# Patient Record
Sex: FEMALE | Race: WHITE
[De-identification: names, ages, dates, MRNs, and addresses within clinical notes are randomized per-mention and may not be internally consistent; named-entity substitution may affect disease eponyms.]

---

## 2021-11-03 ENCOUNTER — HOSPITAL ENCOUNTER (OUTPATIENT)
Dept: HOSPITAL 79 - EXRD | Age: 41
End: 2021-11-03
Attending: INTERNAL MEDICINE
Payer: COMMERCIAL

## 2021-11-03 DIAGNOSIS — R93.6: ICD-10-CM

## 2021-11-03 DIAGNOSIS — L40.50: Primary | ICD-10-CM

## 2021-12-23 ENCOUNTER — HOSPITAL ENCOUNTER (OUTPATIENT)
Dept: HOSPITAL 79 - LAB | Age: 41
End: 2021-12-23
Attending: FAMILY MEDICINE
Payer: COMMERCIAL

## 2021-12-23 DIAGNOSIS — R73.9: ICD-10-CM

## 2021-12-23 DIAGNOSIS — N94.9: ICD-10-CM

## 2021-12-23 DIAGNOSIS — E03.9: ICD-10-CM

## 2021-12-23 DIAGNOSIS — M10.9: Primary | ICD-10-CM

## 2021-12-24 LAB
A/G RATIO: 1.9 (ref 1.2–2.2)
ALBUMIN, SERUM: 4.3 G/DL (ref 3.8–4.8)
ALKALINE PHOSPHATASE, S: 42 IU/L (ref 44–121)
ALPHA-1-ANTITRYPSIN, SERUM: 112 MG/DL (ref 101–187)
ALT (SGPT): 36 IU/L (ref 0–32)
AST (SGOT): 19 IU/L (ref 0–40)
BUN/CREATININE RATIO: 13 (ref 9–23)
BUN: 13 MG/DL (ref 6–24)
CALCIUM, SERUM: 9.4 MG/DL (ref 8.7–10.2)
CARBON DIOXIDE, TOTAL: 22 MMOL/L (ref 20–29)
CHLORIDE, SERUM: 102 MMOL/L (ref 96–106)
EGFR IF AFRICN AM: 77 (ref 59–?)
EGFR IF NONAFRICN AM: 67 (ref 59–?)
ESTIM. AVG GLU (EAG): 126 MG/DL
FERRITIN: 123 NG/ML (ref 15–150)
GLOBULIN, TOTAL: 2.3 G/DL (ref 1.5–4.5)
GLUCOSE, SERUM: 110 MG/DL (ref 65–99)
HEMOGLOBIN A1C: 6 % (ref 4.8–5.6)
MITOCHONDRIAL (M2) ANTIBODY: <20 UNITS (ref 0–20)
POTASSIUM, SERUM: 4.6 MMOL/L (ref 3.5–5.2)
PROTEIN, TOTAL, SERUM: 6.6 G/DL (ref 6–8.5)
SODIUM, SERUM: 139 MMOL/L (ref 134–144)
TSH: 2.75 UIU/ML (ref 0.45–4.5)

## 2022-09-09 ENCOUNTER — OFFICE VISIT (OUTPATIENT)
Dept: OBSTETRICS AND GYNECOLOGY | Facility: CLINIC | Age: 42
End: 2022-09-09

## 2022-09-09 DIAGNOSIS — Z12.4 SCREENING FOR CERVICAL CANCER: ICD-10-CM

## 2022-09-09 DIAGNOSIS — Z30.431 IUD CHECK UP: ICD-10-CM

## 2022-09-09 DIAGNOSIS — Z01.419 ENCOUNTER FOR ANNUAL ROUTINE GYNECOLOGICAL EXAMINATION: Primary | ICD-10-CM

## 2022-09-09 DIAGNOSIS — Z12.31 ENCOUNTER FOR SCREENING MAMMOGRAM FOR MALIGNANT NEOPLASM OF BREAST: ICD-10-CM

## 2022-09-09 PROCEDURE — 99386 PREV VISIT NEW AGE 40-64: CPT | Performed by: OBSTETRICS & GYNECOLOGY

## 2022-09-09 RX ORDER — LANOLIN ALCOHOL/MO/W.PET/CERES
1000 CREAM (GRAM) TOPICAL DAILY
COMMUNITY

## 2022-09-09 NOTE — PROGRESS NOTES
Gynecologic Annual Exam Note          GYN Annual Exam     Gynecologic Exam        Subjective     HPI  Chey Arthur is a 41 y.o. female, , who presents for annual well woman exam as a established patient . No LMP recorded..  Periods are absent secondary to birth control. Patient reports problems with: discomfort underneath left axilary area.  Would like that area examined.  No nodules noted.  .  Partner Status: Marital Status: . She is is sexually active. She has not had new partners.. STD testing recommendations have been explained to the patient and she does not desire STD testing. There were no changes to her medical or surgical history since her last visit..       Additional OB/GYN History   Current contraception: contraceptive methods: IUD.  Insertion date: possibly   Desires to: continue contraception    Last Pap : Over 3-4 years ago. Result: unknown . HPV: unknown   Last Completed Pap Smear     This patient has no relevant Health Maintenance data.        History of abnormal Pap smear: no  Family history of uterine, colon, breast, or ovarian cancer: no  Performs monthly Self-Breast Exam: no  Last mammogram: . Done in [facility]/ ?.    Last Completed Mammogram     This patient has no relevant Health Maintenance data.          History of abnormal mammogram: no    Colonoscopy: has never had a colonoscopy.  Exercises Regularly: yes  Feelings of Anxiety or Depression: no  Tobacco Usage?: No       Current Outpatient Medications:   •  cholecalciferol (VITAMIN D3) 1.25 MG (95969 UT) capsule, Take 50,000 Units by mouth Daily., Disp: , Rfl:   •  levothyroxine (SYNTHROID, LEVOTHROID) 200 MCG tablet, Take 1 tablet by mouth daily., Disp: 90 tablet, Rfl: 1  •  lisinopril (PRINIVIL,ZESTRIL) 10 MG tablet, Take 1 tablet by mouth 2 (two) times a day., Disp: 180 tablet, Rfl: 1  •  vitamin B-12 (CYANOCOBALAMIN) 1000 MCG tablet, Take 1,000 mcg by mouth Daily., Disp: , Rfl:      Patient  denies the need for medication refills today.    OB History        2    Para        Term                AB        Living   2       SAB        IAB        Ectopic        Molar        Multiple        Live Births                    Past Medical History:   Diagnosis Date   • Hypertension    • Hypothyroidism    • Obesity         Past Surgical History:   Procedure Laterality Date   •  SECTION     • CHOLECYSTECTOMY  2019       Health Maintenance   Topic Date Due   • ANNUAL PHYSICAL  Never done   • TDAP/TD VACCINES (1 - Tdap) Never done   • HEPATITIS C SCREENING  Never done   • PAP SMEAR  Never done   • COVID-19 Vaccine (3 - Booster for Pfizer series) 2022   • INFLUENZA VACCINE  10/01/2022   • Annual Gynecologic Pelvic and Breast Exam  09/10/2023   • Pneumococcal Vaccine 0-64  Aged Out       The additional following portions of the patient's history were reviewed and updated as appropriate: allergies, current medications, past family history, past medical history, past social history, past surgical history and problem list.    Review of Systems   Constitutional: Negative for chills, fever and unexpected weight loss.   Respiratory: Negative.    Cardiovascular: Negative.    Gastrointestinal: Negative for abdominal distention and abdominal pain.   Genitourinary: Negative.    Psychiatric/Behavioral: Negative for dysphoric mood and depressed mood.         I have reviewed and agree with the HPI, ROS, and historical information as entered above. Elvis De La Vega MD      Objective   There were no vitals taken for this visit.    Physical Exam  Vitals and nursing note reviewed. Exam conducted with a chaperone present.   Constitutional:       Appearance: She is well-developed. She is obese.   HENT:      Head: Normocephalic and atraumatic.   Neck:      Thyroid: No thyroid mass or thyromegaly.   Cardiovascular:      Rate and Rhythm: Normal rate and regular rhythm.      Heart sounds: Normal heart sounds. No  murmur heard.  Pulmonary:      Effort: Pulmonary effort is normal. No retractions.      Breath sounds: Normal breath sounds. No wheezing, rhonchi or rales.   Chest:      Chest wall: No mass or tenderness.   Breasts:      Right: Normal. No mass, nipple discharge, skin change or tenderness.      Left: Normal. No mass, nipple discharge, skin change or tenderness.       Abdominal:      General: Bowel sounds are normal.      Palpations: Abdomen is soft. Abdomen is not rigid. There is no mass.      Tenderness: There is no abdominal tenderness. There is no guarding.      Hernia: No hernia is present. There is no hernia in the left inguinal area.   Genitourinary:     Labia:         Right: No rash, tenderness or lesion.         Left: No rash, tenderness or lesion.       Vagina: Normal. No vaginal discharge or lesions.      Cervix: No cervical motion tenderness, discharge, lesion or cervical bleeding.      Uterus: Normal. Not enlarged, not fixed and not tender.       Adnexa:         Right: No mass or tenderness.          Left: No mass or tenderness.        Rectum: No external hemorrhoid.      Comments: Cervix posterior.  IUD string visible.  Uterus high in pelvis nonenlarged nontender.  Ovaries nonpalpable nontender.  Musculoskeletal:      Cervical back: Normal range of motion. No muscular tenderness.   Neurological:      Mental Status: She is alert and oriented to person, place, and time.   Psychiatric:         Behavior: Behavior normal.            Assessment and Plan    Problem List Items Addressed This Visit     Screening for cervical cancer    Overview     Last pap 9/25/2019 was negative.  HPV high-risk screen negative.  Pap smear with HPV done 9/9/2022         Encounter for screening mammogram for malignant neoplasm of breast    Overview     Mamm ordered          Relevant Orders    Mammo Screening Digital Tomosynthesis Bilateral With CAD    IUD check up    Overview     Mirena IUD inserted 8/16/2013; due for removal  8/16/2021.           Other Visit Diagnoses     Encounter for annual routine gynecological examination    -  Primary    Relevant Orders    LIQUID-BASED PAP SMEAR, [facility] ([facility])          1. GYN annual well woman exam.  41 years of age.  No menses with Mirena IUD.  2. Patient and unsure when Mirena IUD was inserted.  Patient thinks it may be due for removal.  Desires insertion of another Mirena IUD at that time.  3. Pap guidelines reviewed.  Pap smear with HPV screen done.  4. Reviewed monthly self breast exams.  Instructed to call with lumps, pain, or breast discharge.    5. Ordered Mammogram today  6. Reviewed BMI and weight loss as preventative health measures.   7. Reccommended Flu Vaccine in Fall of each year.  8. RTC in 1 year or PRN with problems.  9. Return in about 1 year (around 9/9/2023) for Annual physical.     Elvis De La Vega MD  09/09/2022

## 2022-09-14 LAB — REF LAB TEST METHOD: NORMAL
